# Patient Record
Sex: MALE | Race: WHITE | NOT HISPANIC OR LATINO | URBAN - NONMETROPOLITAN AREA
[De-identification: names, ages, dates, MRNs, and addresses within clinical notes are randomized per-mention and may not be internally consistent; named-entity substitution may affect disease eponyms.]

---

## 2021-09-20 ENCOUNTER — IMPORTED ENCOUNTER (OUTPATIENT)
Dept: URBAN - NONMETROPOLITAN AREA CLINIC 1 | Facility: CLINIC | Age: 71
End: 2021-09-20

## 2021-09-20 PROBLEM — H25.813: Noted: 2021-09-20

## 2021-09-20 PROBLEM — H40.1411: Noted: 2021-09-20

## 2021-09-20 PROBLEM — H40.1422: Noted: 2021-09-20

## 2021-09-20 PROCEDURE — 92004 COMPRE OPH EXAM NEW PT 1/>: CPT

## 2021-09-20 NOTE — PATIENT DISCUSSION
Cataract(s)-Visually significant cataract OU .-Cataract(s) causing symptomatic impairment of visual function not correctable with a tolerable change in glasses or contact lenses lighting or non-operative means resulting in specific activity limitations and/or participation restrictions including but not limited to reading viewing television driving or meeting vocational or recreational needs. -Expectation is clearer vision and functional improvement in symptoms as well as reduced glare disability after cataract removal.-Order IOLMaster and OPD today. -Recommend LRI OD/TORIC OS  based on today's OPD testing and lifestyle questionnaire.-All questions were answered regarding surgery including pre and post-op medications appointments activity restrictions and anesthetic usage.-The risks benefits and alternatives and special risk factors for the patient were discussed in detail including but not limited to: bleeding infection retinal detachment vitreous loss problems with the implant and possible need for additional surgery.-Although rare the possibility of complete vision loss was discussed.-The possible need for glasses post-operatively was discussed.-Order medical clearance exam based on history of arthritis -Patient elects to proceed with cataract surgery OS . Will schedule at patient's convenience and re-evaluate OD  in the future. Discussed lens. Qualifies for LRI OD and TORIC OS discussed lens benefits w/ need for glasses for reading still. Pt elects LenSx OU. Post op inflammation anticiapted discussed dextenza insertion after surgery. 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  Continue to monitor

## 2021-10-05 ENCOUNTER — IMPORTED ENCOUNTER (OUTPATIENT)
Dept: URBAN - NONMETROPOLITAN AREA CLINIC 1 | Facility: CLINIC | Age: 71
End: 2021-10-05

## 2021-10-05 PROBLEM — M19.90: Noted: 2021-10-05

## 2021-10-05 PROBLEM — J43.9: Noted: 2021-10-05

## 2021-10-05 PROBLEM — H40.1411: Noted: 2021-10-05

## 2021-10-05 PROBLEM — H25.813: Noted: 2021-10-05

## 2021-10-05 PROBLEM — Z01.818: Noted: 2021-10-05

## 2021-10-05 PROBLEM — H40.1422: Noted: 2021-10-05

## 2021-10-05 PROCEDURE — 99214 OFFICE O/P EST MOD 30 MIN: CPT

## 2021-11-05 ENCOUNTER — IMPORTED ENCOUNTER (OUTPATIENT)
Dept: URBAN - NONMETROPOLITAN AREA CLINIC 1 | Facility: CLINIC | Age: 71
End: 2021-11-05

## 2021-11-05 PROCEDURE — 0356T INSERTION OF DRUG-ELUTING IMPLANT (INCLUDING PUNCTAL DILATION AND IMPLANT REMOVAL WHEN PERFORMED) INTO LACRIMAL CANALICULUS, EACH: CPT

## 2021-11-05 PROCEDURE — V2787 ASTIGMATISM-CORRECT FUNCTION: HCPCS

## 2021-11-05 PROCEDURE — 92136 OPHTHALMIC BIOMETRY: CPT

## 2021-11-05 PROCEDURE — 99024 POSTOP FOLLOW-UP VISIT: CPT

## 2021-11-05 PROCEDURE — 0191T INSERTION OF ANTERIOR SEGMENT AQUEOUS DRAINAGE DEVICE, WITHOUT EXTRAOCULAR RESERVOIR; INTERNAL APPROACH, INTO THE TRABECULAR MESHWORK, INITIAL INSERTION: CPT

## 2021-11-05 PROCEDURE — 66984 XCAPSL CTRC RMVL W/O ECP: CPT

## 2021-11-05 NOTE — PATIENT DISCUSSION
Cataract(s)-Visually significant cataract OD. -Cataract(s) causing symptomatic impairment of visual function not correctable with a tolerable change in glasses or contact lenses lighting or non-operative means resulting in specific activity limitations and/or participation restrictions including but not limited to reading viewing television driving or meeting vocational or recreational needs. -Expectation is clearer vision and functional improvement in symptoms as well as reduced glare disability after cataract removal.-Recommend Toric IOL   /   Limbal Relaxing Incisions based on previous OPD testing and lifestyle questionnaire.-All questions were answered regarding surgery including pre and post-op medications appointments activity restrictions and anesthetic usage.-The risks benefits and alternatives and special risk factors for the patient were discussed in detail including but not limited to: bleeding infection retinal detachment vitreous loss problems with the implant and possible need for additional surgery.-Although rare the possibility of complete vision loss was discussed.-The possible need for glasses post-operatively was discussed.-Order medical clearance exam based on history of arthritis.-Patient elects to proceed with cataract surgery OD. s/p PCIOL-Pt doing well s/p PCIOL. -Continue post-op gtts according to instruction sheet and sleep with eye shield over eye for 7 nights.-Avoid bending at the waist lifting anything over 5lbs and dirty or arabella environments. -RTC .

## 2021-11-08 PROBLEM — H25.811: Noted: 2021-11-08

## 2021-11-08 PROBLEM — Z98.42: Noted: 2021-11-08

## 2021-11-10 ENCOUNTER — IMPORTED ENCOUNTER (OUTPATIENT)
Dept: URBAN - NONMETROPOLITAN AREA CLINIC 1 | Facility: CLINIC | Age: 71
End: 2021-11-10

## 2021-11-10 PROBLEM — Z98.42: Noted: 2021-11-10

## 2021-11-10 PROBLEM — H25.811: Noted: 2021-11-10

## 2021-11-10 PROCEDURE — 99024 POSTOP FOLLOW-UP VISIT: CPT

## 2021-11-10 NOTE — PATIENT DISCUSSION
5 day POV CE OS Toric/Trad+Dextenza- Discussed diagnosis in detail with patient. - Patient doing well and stable. - Continue post op drops as directed. - IOP at 24 OS. - Restart Timolol QAM OS. - Continue to monitor.

## 2021-11-19 ENCOUNTER — IMPORTED ENCOUNTER (OUTPATIENT)
Dept: URBAN - NONMETROPOLITAN AREA CLINIC 1 | Facility: CLINIC | Age: 71
End: 2021-11-19

## 2021-11-19 PROBLEM — Z98.41: Noted: 2021-11-19

## 2021-11-19 PROBLEM — Z98.42: Noted: 2021-11-19

## 2021-11-19 PROCEDURE — 92136 OPHTHALMIC BIOMETRY: CPT

## 2021-11-19 PROCEDURE — 99024 POSTOP FOLLOW-UP VISIT: CPT

## 2021-11-19 PROCEDURE — 66999 UNLISTED PX ANT SEGMENT EYE: CPT

## 2021-11-19 PROCEDURE — 0356T INSERTION OF DRUG-ELUTING IMPLANT (INCLUDING PUNCTAL DILATION AND IMPLANT REMOVAL WHEN PERFORMED) INTO LACRIMAL CANALICULUS, EACH: CPT

## 2021-11-19 PROCEDURE — 66984 XCAPSL CTRC RMVL W/O ECP: CPT

## 2021-11-19 NOTE — PATIENT DISCUSSION
Same Day POV CE OD 11/19/21 LRI/LenSx POV CE OS 11/5/15 TORIC/LenSx w/Dextenza OU- Discussed diagnosis in detail with patient. - Patient doing well- IOP increased to 39 patient admits he hasn't used his timolol in two days.- Restart Timolol BID OS.- Continue post op drops as directed. - Continue to monitor. 1 week POV with Dr. Carloz Aquino

## 2022-04-15 ASSESSMENT — TONOMETRY
OD_IOP_MMHG: 10
OD_IOP_MMHG: 12
OD_IOP_MMHG: 16
OS_IOP_MMHG: 24
OD_IOP_MMHG: 12
OS_IOP_MMHG: 16
OS_IOP_MMHG: 16
OS_IOP_MMHG: 39

## 2022-04-15 ASSESSMENT — VISUAL ACUITY
OD_GLARE: 20/400
OD_PH: 20/30
OD_CC: 20/40
OS_CC: 20/40-
OS_GLARE: 20/400
OD_GLARE: 20/400
OS_CC: 20/30
OD_CC: 20/200
OD_GLARE: 20/400
OD_PH: 20/40
OD_CC: 20/60-
OS_CC: 20/40
OS_SC: 20/25-
OS_AM: 20/20-
OS_CC: 20/400
OS_PH: 20/50
OS_CC: 20/30+
OD_CC: 20/300
OD_PAM: 20/20-
OD_PAM: 20/20-
OD_GLARE: 20/400
OS_GLARE: 20/400
OD_PAM: 20/20-
OD_CC: 20/100-